# Patient Record
Sex: MALE | Race: BLACK OR AFRICAN AMERICAN | NOT HISPANIC OR LATINO | URBAN - METROPOLITAN AREA
[De-identification: names, ages, dates, MRNs, and addresses within clinical notes are randomized per-mention and may not be internally consistent; named-entity substitution may affect disease eponyms.]

---

## 2022-06-12 ENCOUNTER — HOSPITAL ENCOUNTER (EMERGENCY)
Facility: HOSPITAL | Age: 42
Discharge: HOME | End: 2022-06-12
Attending: EMERGENCY MEDICINE

## 2022-06-12 VITALS
RESPIRATION RATE: 17 BRPM | BODY MASS INDEX: 31.83 KG/M2 | HEART RATE: 68 BPM | WEIGHT: 210 LBS | TEMPERATURE: 98 F | HEIGHT: 68 IN | SYSTOLIC BLOOD PRESSURE: 121 MMHG | DIASTOLIC BLOOD PRESSURE: 68 MMHG | OXYGEN SATURATION: 99 %

## 2022-06-12 DIAGNOSIS — M25.559 HIP PAIN: Primary | ICD-10-CM

## 2022-06-12 PROCEDURE — 99284 EMERGENCY DEPT VISIT MOD MDM: CPT | Mod: 25

## 2022-06-12 PROCEDURE — 63600175 PHARM REV CODE 636 W HCPCS: Performed by: PHYSICIAN ASSISTANT

## 2022-06-12 PROCEDURE — 36415 COLL VENOUS BLD VENIPUNCTURE: CPT | Performed by: EMERGENCY MEDICINE

## 2022-06-12 PROCEDURE — 87389 HIV-1 AG W/HIV-1&-2 AB AG IA: CPT | Performed by: EMERGENCY MEDICINE

## 2022-06-12 PROCEDURE — 86803 HEPATITIS C AB TEST: CPT | Performed by: EMERGENCY MEDICINE

## 2022-06-12 PROCEDURE — 96372 THER/PROPH/DIAG INJ SC/IM: CPT | Performed by: PHYSICIAN ASSISTANT

## 2022-06-12 RX ORDER — DEXAMETHASONE SODIUM PHOSPHATE 4 MG/ML
8 INJECTION, SOLUTION INTRA-ARTICULAR; INTRALESIONAL; INTRAMUSCULAR; INTRAVENOUS; SOFT TISSUE
Status: COMPLETED | OUTPATIENT
Start: 2022-06-12 | End: 2022-06-12

## 2022-06-12 RX ORDER — NAPROXEN 500 MG/1
500 TABLET ORAL 2 TIMES DAILY WITH MEALS
Qty: 14 TABLET | Refills: 0 | Status: SHIPPED | OUTPATIENT
Start: 2022-06-12 | End: 2022-06-19

## 2022-06-12 RX ORDER — KETOROLAC TROMETHAMINE 30 MG/ML
30 INJECTION, SOLUTION INTRAMUSCULAR; INTRAVENOUS
Status: COMPLETED | OUTPATIENT
Start: 2022-06-12 | End: 2022-06-12

## 2022-06-12 RX ORDER — CYCLOBENZAPRINE HCL 10 MG
10 TABLET ORAL 3 TIMES DAILY PRN
Qty: 21 TABLET | Refills: 0 | Status: SHIPPED | OUTPATIENT
Start: 2022-06-12 | End: 2022-06-19

## 2022-06-12 RX ADMIN — KETOROLAC TROMETHAMINE 30 MG: 30 INJECTION, SOLUTION INTRAMUSCULAR at 12:06

## 2022-06-12 RX ADMIN — DEXAMETHASONE SODIUM PHOSPHATE 8 MG: 4 INJECTION, SOLUTION INTRA-ARTICULAR; INTRALESIONAL; INTRAMUSCULAR; INTRAVENOUS; SOFT TISSUE at 12:06

## 2022-06-12 NOTE — DISCHARGE INSTRUCTIONS
Take medications as prescribed.  Follow up with your primary care provider and orthopedics if symptoms don't improve.  For worsening symptoms, chest pain, shortness of breath, increased abdominal pain, high grade fever, stroke or stroke like symptoms, immediately go to the nearest Emergency Room or call 911 as soon as possible.

## 2022-06-12 NOTE — ED NOTES
Discussed stretching with pt and wife for sciatic nerve pain relief. Pt verbalized understanding. Pt states he has never had been able to afford rehabd and is on his feet all day. Nurse educated pt on stretches and that he can do them on his break at at bedtime daily to help relieve the tightness in his hips.

## 2022-06-12 NOTE — ED PROVIDER NOTES
Encounter Date: 6/12/2022    SCRIBE #1 NOTE: Leonarda GOMEZ, am scribing for, and in the presence of, Minerva Madrigal PA-C.       History     Chief Complaint   Patient presents with    Hip Pain     Previous surgery / pain started last Tuesday      Time seen by provider: 11:17 AM on 06/12/2022    Sachin Elizabeth is a 41 y.o. male with a personal Hx of L hip surgery who presents to the ED for evaluation of L hip pain which started last Tuesday (5 days ago).  Patient notes the hip pain radiates down the LLE.  The patient denies recent injury to the area or any other symptoms at this time.  No OTC medications were taken for pain and no allergies to medications mentioned.  He has no other known PMHx or PSHx documented.      The history is provided by the patient.     Review of patient's allergies indicates:  No Known Allergies  No past medical history on file.  No past surgical history on file.  No family history on file.     Review of Systems   Constitutional: Negative for activity change, appetite change, chills and fever.   HENT: Negative for congestion, rhinorrhea and sore throat.    Eyes: Negative for redness and visual disturbance.   Respiratory: Negative for cough, chest tightness and shortness of breath.    Cardiovascular: Negative for chest pain.   Gastrointestinal: Negative for abdominal pain, diarrhea, nausea and vomiting.   Genitourinary: Negative for dysuria and frequency.   Musculoskeletal: Positive for arthralgias and myalgias. Negative for back pain, neck pain and neck stiffness.   Skin: Negative for rash.   Neurological: Negative for dizziness, syncope, numbness and headaches.       Physical Exam     Initial Vitals [06/12/22 1049]   BP Pulse Resp Temp SpO2   136/88 79 18 98.3 °F (36.8 °C) 97 %      MAP       --         Physical Exam    Nursing note and vitals reviewed.  Constitutional: Vital signs are normal. He appears well-developed and well-nourished. He is cooperative.  Non-toxic appearance. He  does not have a sickly appearance.   HENT:   Head: Normocephalic and atraumatic.   Right Ear: External ear normal.   Left Ear: External ear normal.   Nose: Nose normal.   Eyes: Conjunctivae and lids are normal.   Neck:    Full passive range of motion without pain.     Cardiovascular: Normal rate, regular rhythm and normal heart sounds. Exam reveals no gallop and no friction rub.    No murmur heard.  Pulmonary/Chest: Breath sounds normal. He has no wheezes. He has no rhonchi. He has no rales.   Musculoskeletal:      Cervical back: Full passive range of motion without pain.      Comments: Diffuse tenderness to the left hip and left SI joint. 2+ pedal pulse. No skin changes. No swelling. Full, passive ROM. No lumbar spine tenderness.      Neurological: He is alert.   Skin: Skin is warm, dry and intact. No rash noted.         ED Course   Procedures  Labs Reviewed   HIV 1 / 2 ANTIBODY   HEPATITIS C ANTIBODY          Imaging Results          X-Ray Hip 2 or 3 views Left (with Pelvis when performed) (Final result)  Result time 06/12/22 11:55:37    Final result by Valeria Barrera MD (06/12/22 11:55:37)                 Impression:      Normal left hip.      Electronically signed by: Valeria Barrera  Date:    06/12/2022  Time:    11:55             Narrative:    EXAMINATION:  XR HIP WITH PELVIS WHEN PERFORMED, 2 OR 3 VIEWS LEFT    CLINICAL HISTORY:  Pain in unspecified hip    TECHNIQUE:  AP view of the pelvis and frog leg lateral view of the left hip were performed.    COMPARISON:  None    FINDINGS:  The hips, pubic symphysis and sacroiliac joints are intact.  There is no evidence of a pelvic fracture.  No significant degenerative changes are noted at the left hip.  There is normal bony mineralization.                                 Medications   ketorolac injection 30 mg (30 mg Intramuscular Given 6/12/22 1215)   dexamethasone injection 8 mg (8 mg Intramuscular Given 6/12/22 1215)     Medical Decision Making:    History:   Old Medical Records: I decided to obtain old medical records.  Independently Interpreted Test(s):   I have ordered and independently interpreted X-rays - see prior notes.  Clinical Tests:   Lab Tests: Ordered and Reviewed  Radiological Study: Ordered and Reviewed       APC / Resident Notes:   Urgent evaluation of a 41-year-old male who presents with atraumatic left hip pain.  He reports a history of prior surgery to the hip.  He reports pain radiates down the left lower extremity.  He is neurovascularly intact.  He has pain to the SI joint.  No significant lumbar spine tenderness.  He has diffuse tenderness to the left hip.  He has full, passive range of motion.  X-ray shows no acute fracture.  I suspect sciatica.  He was given IM Toradol and steroids here in the ER.  Recommend follow-up orthopedics if symptoms persist. Discussed results with patient. Return precautions given. Based on my clinical evaluation, I do not appreciate any immediate, emergent, or life threatening condition or etiology that warrants additional workup today and feel that the patient can be discharged with close follow up care.  Patient is to follow up with their primary care provider. All questions answered.        Scribe Attestation:   Scribe #1: I performed the above scribed service and the documentation accurately describes the services I performed. I attest to the accuracy of the note.               I, Minerva Madrigal PA-C, personally performed the services described in this documentation. All medical record entries made by the scribe were at my direction and in my presence.  I have reviewed the chart and agree that the record reflects my personal performance and is accurate and complete. Minerva Madrigal PA-C.  7:49 PM 06/12/2022      Clinical Impression:   Final diagnoses:  [M25.559] Hip pain (Primary)          ED Disposition Condition    Discharge Stable        ED Prescriptions     Medication Sig Dispense Start Date  End Date Auth. Provider    naproxen (NAPROSYN) 500 MG tablet Take 1 tablet (500 mg total) by mouth 2 (two) times daily with meals. for 7 days 14 tablet 6/12/2022 6/19/2022 Minerva Madrigal PA-C    cyclobenzaprine (FLEXERIL) 10 MG tablet Take 1 tablet (10 mg total) by mouth 3 (three) times daily as needed for Muscle spasms. 21 tablet 6/12/2022 6/19/2022 Minerva Madrigal PA-C        Follow-up Information     Follow up With Specialties Details Why Contact Info    [facility] Appointment Line  Schedule an appointment as soon as possible for a visit  For follow up if you don't have a primary care provider [phone]    Karl Lopez MD Orthopedic Surgery   [address] [place] [address]  [facility] ORTHOPEDICS & SPORTS MEDICINE  [facility] [id number]  [phone]      [facility] Emergency Dept Emergency Medicine  As needed [address] [place] [id number] [phone]           Minerva Madrigal PA-C  06/12/22 1949

## 2022-06-12 NOTE — ED NOTES
D/C instructions and Rx in pt possession along with belongings. No other needs at this time. Pt AAOx4, Abc's intac Pt ambulatory to ED Lobby without assistance, steady gait.t. NADN. No adverse reaction to medication given.

## 2022-06-13 LAB
HCV AB SERPL QL IA: NEGATIVE
HIV 1+2 AB+HIV1 P24 AG SERPL QL IA: NEGATIVE